# Patient Record
Sex: FEMALE | Race: WHITE | Employment: FULL TIME | URBAN - METROPOLITAN AREA
[De-identification: names, ages, dates, MRNs, and addresses within clinical notes are randomized per-mention and may not be internally consistent; named-entity substitution may affect disease eponyms.]

---

## 2022-11-10 ENCOUNTER — HOSPITAL ENCOUNTER (EMERGENCY)
Dept: GENERAL RADIOLOGY | Age: 57
Discharge: HOME | DRG: 190 | End: 2022-11-13

## 2022-11-10 LAB
ALBUMIN SERPL-MCNC: 3.7 G/DL (ref 3.5–5)
ALBUMIN/GLOB SERPL: 1 {RATIO} (ref 0.4–1.6)
ALP SERPL-CCNC: 67 U/L (ref 50–136)
ALT SERPL-CCNC: 38 U/L (ref 12–65)
ANION GAP SERPL CALC-SCNC: 4 MMOL/L (ref 2–11)
AST SERPL-CCNC: 19 U/L (ref 15–37)
B PERT DNA SPEC QL NAA+PROBE: NOT DETECTED
BASOPHILS # BLD: 0.1 K/UL (ref 0–0.2)
BASOPHILS NFR BLD: 1 % (ref 0–2)
BILIRUB SERPL-MCNC: 0.4 MG/DL (ref 0.2–1.1)
BORDETELLA PARAPERTUSSIS BY PCR: NOT DETECTED
BUN SERPL-MCNC: 14 MG/DL (ref 6–23)
C PNEUM DNA SPEC QL NAA+PROBE: NOT DETECTED
CALCIUM SERPL-MCNC: 9.3 MG/DL (ref 8.3–10.4)
CHLORIDE SERPL-SCNC: 104 MMOL/L (ref 101–110)
CO2 SERPL-SCNC: 27 MMOL/L (ref 21–32)
CREAT SERPL-MCNC: 0.8 MG/DL (ref 0.6–1)
DIFFERENTIAL METHOD BLD: ABNORMAL
EOSINOPHIL # BLD: 0.1 K/UL (ref 0–0.8)
EOSINOPHIL NFR BLD: 1 % (ref 0.5–7.8)
ERYTHROCYTE [DISTWIDTH] IN BLOOD BY AUTOMATED COUNT: 13.4 % (ref 11.9–14.6)
FLUAV SUBTYP SPEC NAA+PROBE: NOT DETECTED
FLUBV RNA SPEC QL NAA+PROBE: NOT DETECTED
GLOBULIN SER CALC-MCNC: 3.8 G/DL (ref 2.8–4.5)
GLUCOSE SERPL-MCNC: 155 MG/DL (ref 65–100)
HADV DNA SPEC QL NAA+PROBE: NOT DETECTED
HCOV 229E RNA SPEC QL NAA+PROBE: NOT DETECTED
HCOV HKU1 RNA SPEC QL NAA+PROBE: NOT DETECTED
HCOV NL63 RNA SPEC QL NAA+PROBE: NOT DETECTED
HCOV OC43 RNA SPEC QL NAA+PROBE: NOT DETECTED
HCT VFR BLD AUTO: 44.1 % (ref 35.8–46.3)
HGB BLD-MCNC: 14.1 G/DL (ref 11.7–15.4)
HMPV RNA SPEC QL NAA+PROBE: NOT DETECTED
HPIV1 RNA SPEC QL NAA+PROBE: NOT DETECTED
HPIV2 RNA SPEC QL NAA+PROBE: NOT DETECTED
HPIV3 RNA SPEC QL NAA+PROBE: NOT DETECTED
HPIV4 RNA SPEC QL NAA+PROBE: NOT DETECTED
IMM GRANULOCYTES # BLD AUTO: 0 K/UL (ref 0–0.5)
IMM GRANULOCYTES NFR BLD AUTO: 0 % (ref 0–5)
LYMPHOCYTES # BLD: 2 K/UL (ref 0.5–4.6)
LYMPHOCYTES NFR BLD: 16 % (ref 13–44)
M PNEUMO DNA SPEC QL NAA+PROBE: NOT DETECTED
MAGNESIUM SERPL-MCNC: 2.2 MG/DL (ref 1.8–2.4)
MCH RBC QN AUTO: 29.3 PG (ref 26.1–32.9)
MCHC RBC AUTO-ENTMCNC: 32 G/DL (ref 31.4–35)
MCV RBC AUTO: 91.5 FL (ref 82–102)
MONOCYTES # BLD: 0.7 K/UL (ref 0.1–1.3)
MONOCYTES NFR BLD: 6 % (ref 4–12)
NEUTS SEG # BLD: 9.1 K/UL (ref 1.7–8.2)
NEUTS SEG NFR BLD: 76 % (ref 43–78)
NRBC # BLD: 0 K/UL (ref 0–0.2)
PLATELET # BLD AUTO: 220 K/UL (ref 150–450)
PMV BLD AUTO: 10.8 FL (ref 9.4–12.3)
POTASSIUM SERPL-SCNC: 3.8 MMOL/L (ref 3.5–5.1)
PROT SERPL-MCNC: 7.5 G/DL (ref 6.3–8.2)
RBC # BLD AUTO: 4.82 M/UL (ref 4.05–5.2)
RSV RNA SPEC QL NAA+PROBE: NOT DETECTED
RV+EV RNA SPEC QL NAA+PROBE: NOT DETECTED
SARS-COV-2 RNA RESP QL NAA+PROBE: NOT DETECTED
SODIUM SERPL-SCNC: 135 MMOL/L (ref 133–143)
WBC # BLD AUTO: 12 K/UL (ref 4.3–11.1)

## 2022-11-10 PROCEDURE — 93005 ELECTROCARDIOGRAM TRACING: CPT | Performed by: EMERGENCY MEDICINE

## 2022-11-10 PROCEDURE — 96374 THER/PROPH/DIAG INJ IV PUSH: CPT

## 2022-11-10 PROCEDURE — 83735 ASSAY OF MAGNESIUM: CPT

## 2022-11-10 PROCEDURE — 0202U NFCT DS 22 TRGT SARS-COV-2: CPT

## 2022-11-10 PROCEDURE — 96376 TX/PRO/DX INJ SAME DRUG ADON: CPT

## 2022-11-10 PROCEDURE — 85025 COMPLETE CBC W/AUTO DIFF WBC: CPT

## 2022-11-10 PROCEDURE — 80053 COMPREHEN METABOLIC PANEL: CPT

## 2022-11-10 PROCEDURE — 71045 X-RAY EXAM CHEST 1 VIEW: CPT

## 2022-11-10 PROCEDURE — 99285 EMERGENCY DEPT VISIT HI MDM: CPT

## 2022-11-10 PROCEDURE — 96375 TX/PRO/DX INJ NEW DRUG ADDON: CPT

## 2022-11-10 RX ORDER — SODIUM CHLORIDE 0.9 % (FLUSH) 0.9 %
10 SYRINGE (ML) INJECTION EVERY 8 HOURS
Status: DISCONTINUED | OUTPATIENT
Start: 2022-11-10 | End: 2022-11-13 | Stop reason: HOSPADM

## 2022-11-10 RX ORDER — SODIUM CHLORIDE 0.9 % (FLUSH) 0.9 %
10 SYRINGE (ML) INJECTION AS NEEDED
Status: DISCONTINUED | OUTPATIENT
Start: 2022-11-10 | End: 2022-11-13 | Stop reason: HOSPADM

## 2022-11-10 ASSESSMENT — PAIN - FUNCTIONAL ASSESSMENT: PAIN_FUNCTIONAL_ASSESSMENT: NONE - DENIES PAIN

## 2022-11-11 ENCOUNTER — APPOINTMENT (OUTPATIENT)
Dept: CT IMAGING | Age: 57
DRG: 190 | End: 2022-11-11

## 2022-11-11 ENCOUNTER — HOSPITAL ENCOUNTER (INPATIENT)
Age: 57
LOS: 2 days | Discharge: HOME | DRG: 190 | End: 2022-11-13
Attending: EMERGENCY MEDICINE | Admitting: INTERNAL MEDICINE

## 2022-11-11 DIAGNOSIS — J18.9 ATYPICAL PNEUMONIA: ICD-10-CM

## 2022-11-11 DIAGNOSIS — J44.1 COPD EXACERBATION (HCC): Primary | ICD-10-CM

## 2022-11-11 DIAGNOSIS — R00.0 TACHYCARDIA: ICD-10-CM

## 2022-11-11 DIAGNOSIS — R09.02 HYPOXIA: ICD-10-CM

## 2022-11-11 PROBLEM — F17.200 SMOKER: Status: ACTIVE | Noted: 2022-11-11

## 2022-11-11 LAB
D DIMER PPP FEU-MCNC: 0.76 UG/ML(FEU)
EKG ATRIAL RATE: 122 BPM
EKG DIAGNOSIS: NORMAL
EKG P AXIS: 67 DEGREES
EKG P-R INTERVAL: 152 MS
EKG Q-T INTERVAL: 296 MS
EKG QRS DURATION: 80 MS
EKG QTC CALCULATION (BAZETT): 421 MS
EKG R AXIS: 70 DEGREES
EKG T AXIS: 73 DEGREES
EKG VENTRICULAR RATE: 122 BPM

## 2022-11-11 PROCEDURE — 85379 FIBRIN DEGRADATION QUANT: CPT

## 2022-11-11 PROCEDURE — 71260 CT THORAX DX C+: CPT | Performed by: PHYSICIAN ASSISTANT

## 2022-11-11 PROCEDURE — 6360000004 HC RX CONTRAST MEDICATION: Performed by: PHYSICIAN ASSISTANT

## 2022-11-11 PROCEDURE — 2700000000 HC OXYGEN THERAPY PER DAY

## 2022-11-11 PROCEDURE — 6360000002 HC RX W HCPCS: Performed by: INTERNAL MEDICINE

## 2022-11-11 PROCEDURE — 6370000000 HC RX 637 (ALT 250 FOR IP): Performed by: INTERNAL MEDICINE

## 2022-11-11 PROCEDURE — 2580000003 HC RX 258: Performed by: EMERGENCY MEDICINE

## 2022-11-11 PROCEDURE — 94760 N-INVAS EAR/PLS OXIMETRY 1: CPT

## 2022-11-11 PROCEDURE — 2580000003 HC RX 258: Performed by: PHYSICIAN ASSISTANT

## 2022-11-11 PROCEDURE — 94640 AIRWAY INHALATION TREATMENT: CPT

## 2022-11-11 PROCEDURE — 1100000000 HC RM PRIVATE

## 2022-11-11 PROCEDURE — 94761 N-INVAS EAR/PLS OXIMETRY MLT: CPT

## 2022-11-11 PROCEDURE — 6360000002 HC RX W HCPCS: Performed by: PHYSICIAN ASSISTANT

## 2022-11-11 RX ORDER — ONDANSETRON 4 MG/1
4 TABLET, ORALLY DISINTEGRATING ORAL EVERY 8 HOURS PRN
Status: DISCONTINUED | OUTPATIENT
Start: 2022-11-11 | End: 2022-11-13 | Stop reason: HOSPADM

## 2022-11-11 RX ORDER — SODIUM CHLORIDE 0.9 % (FLUSH) 0.9 %
5-40 SYRINGE (ML) INJECTION EVERY 12 HOURS SCHEDULED
Status: DISCONTINUED | OUTPATIENT
Start: 2022-11-11 | End: 2022-11-13 | Stop reason: HOSPADM

## 2022-11-11 RX ORDER — SODIUM CHLORIDE 0.9 % (FLUSH) 0.9 %
10 SYRINGE (ML) INJECTION
Status: DISCONTINUED | OUTPATIENT
Start: 2022-11-11 | End: 2022-11-13 | Stop reason: HOSPADM

## 2022-11-11 RX ORDER — IPRATROPIUM BROMIDE AND ALBUTEROL SULFATE 2.5; .5 MG/3ML; MG/3ML
1 SOLUTION RESPIRATORY (INHALATION)
Status: DISCONTINUED | OUTPATIENT
Start: 2022-11-11 | End: 2022-11-13 | Stop reason: HOSPADM

## 2022-11-11 RX ORDER — POLYETHYLENE GLYCOL 3350 17 G/17G
17 POWDER, FOR SOLUTION ORAL DAILY PRN
Status: DISCONTINUED | OUTPATIENT
Start: 2022-11-11 | End: 2022-11-13 | Stop reason: HOSPADM

## 2022-11-11 RX ORDER — PREDNISONE 20 MG/1
20 TABLET ORAL DAILY
Status: DISCONTINUED | OUTPATIENT
Start: 2022-11-11 | End: 2022-11-13 | Stop reason: HOSPADM

## 2022-11-11 RX ORDER — PHENTERMINE HYDROCHLORIDE 37.5 MG/1
TABLET ORAL
Status: ON HOLD | COMMUNITY
Start: 2022-10-12 | End: 2022-11-13 | Stop reason: HOSPADM

## 2022-11-11 RX ORDER — ACETAMINOPHEN 325 MG/1
650 TABLET ORAL EVERY 6 HOURS PRN
Status: DISCONTINUED | OUTPATIENT
Start: 2022-11-11 | End: 2022-11-13 | Stop reason: HOSPADM

## 2022-11-11 RX ORDER — SODIUM CHLORIDE 9 MG/ML
INJECTION, SOLUTION INTRAVENOUS PRN
Status: DISCONTINUED | OUTPATIENT
Start: 2022-11-11 | End: 2022-11-13 | Stop reason: HOSPADM

## 2022-11-11 RX ORDER — 0.9 % SODIUM CHLORIDE 0.9 %
100 INTRAVENOUS SOLUTION INTRAVENOUS
Status: DISCONTINUED | OUTPATIENT
Start: 2022-11-11 | End: 2022-11-13 | Stop reason: HOSPADM

## 2022-11-11 RX ORDER — GUAIFENESIN 600 MG/1
600 TABLET, EXTENDED RELEASE ORAL 2 TIMES DAILY
Status: DISCONTINUED | OUTPATIENT
Start: 2022-11-11 | End: 2022-11-13 | Stop reason: HOSPADM

## 2022-11-11 RX ORDER — ENOXAPARIN SODIUM 100 MG/ML
40 INJECTION SUBCUTANEOUS DAILY
Status: DISCONTINUED | OUTPATIENT
Start: 2022-11-11 | End: 2022-11-13 | Stop reason: HOSPADM

## 2022-11-11 RX ORDER — OMEGA-3 FATTY ACIDS/FISH OIL 300-1000MG
CAPSULE ORAL
Status: ON HOLD | COMMUNITY
End: 2022-11-13 | Stop reason: HOSPADM

## 2022-11-11 RX ORDER — SODIUM CHLORIDE 0.9 % (FLUSH) 0.9 %
5-40 SYRINGE (ML) INJECTION PRN
Status: DISCONTINUED | OUTPATIENT
Start: 2022-11-11 | End: 2022-11-13 | Stop reason: HOSPADM

## 2022-11-11 RX ORDER — ONDANSETRON 2 MG/ML
4 INJECTION INTRAMUSCULAR; INTRAVENOUS EVERY 6 HOURS PRN
Status: DISCONTINUED | OUTPATIENT
Start: 2022-11-11 | End: 2022-11-13 | Stop reason: HOSPADM

## 2022-11-11 RX ORDER — ALBUTEROL SULFATE 90 UG/1
2 AEROSOL, METERED RESPIRATORY (INHALATION) EVERY 4 HOURS PRN
Status: ON HOLD | COMMUNITY
Start: 2016-07-07 | End: 2022-11-13 | Stop reason: SDUPTHER

## 2022-11-11 RX ORDER — ACETAMINOPHEN 650 MG/1
650 SUPPOSITORY RECTAL EVERY 6 HOURS PRN
Status: DISCONTINUED | OUTPATIENT
Start: 2022-11-11 | End: 2022-11-13 | Stop reason: HOSPADM

## 2022-11-11 RX ADMIN — IPRATROPIUM BROMIDE AND ALBUTEROL SULFATE 1 AMPULE: .5; 3 SOLUTION RESPIRATORY (INHALATION) at 20:53

## 2022-11-11 RX ADMIN — IOPAMIDOL 100 ML: 755 INJECTION, SOLUTION INTRAVENOUS at 05:10

## 2022-11-11 RX ADMIN — SODIUM CHLORIDE, PRESERVATIVE FREE 10 ML: 5 INJECTION INTRAVENOUS at 21:32

## 2022-11-11 RX ADMIN — IPRATROPIUM BROMIDE AND ALBUTEROL SULFATE 1 AMPULE: .5; 3 SOLUTION RESPIRATORY (INHALATION) at 11:16

## 2022-11-11 RX ADMIN — GUAIFENESIN 600 MG: 600 TABLET, EXTENDED RELEASE ORAL at 21:32

## 2022-11-11 RX ADMIN — PREDNISONE 20 MG: 20 TABLET ORAL at 11:54

## 2022-11-11 RX ADMIN — SODIUM CHLORIDE, PRESERVATIVE FREE 10 ML: 5 INJECTION INTRAVENOUS at 06:11

## 2022-11-11 RX ADMIN — CEFTRIAXONE 1000 MG: 1 INJECTION, POWDER, FOR SOLUTION INTRAMUSCULAR; INTRAVENOUS at 07:59

## 2022-11-11 RX ADMIN — IPRATROPIUM BROMIDE AND ALBUTEROL SULFATE 1 AMPULE: .5; 3 SOLUTION RESPIRATORY (INHALATION) at 15:28

## 2022-11-11 RX ADMIN — ENOXAPARIN SODIUM 40 MG: 100 INJECTION SUBCUTANEOUS at 11:58

## 2022-11-11 RX ADMIN — GUAIFENESIN 600 MG: 600 TABLET, EXTENDED RELEASE ORAL at 11:54

## 2022-11-11 RX ADMIN — SODIUM CHLORIDE, PRESERVATIVE FREE 10 ML: 5 INJECTION INTRAVENOUS at 03:58

## 2022-11-11 SDOH — ECONOMIC STABILITY: TRANSPORTATION INSECURITY
IN THE PAST 12 MONTHS, HAS THE LACK OF TRANSPORTATION KEPT YOU FROM MEDICAL APPOINTMENTS OR FROM GETTING MEDICATIONS?: NO

## 2022-11-11 SDOH — SOCIAL STABILITY: SOCIAL INSECURITY
WITHIN THE LAST YEAR, HAVE TO BEEN RAPED OR FORCED TO HAVE ANY KIND OF SEXUAL ACTIVITY BY YOUR PARTNER OR EX-PARTNER?: NO

## 2022-11-11 SDOH — SOCIAL STABILITY: SOCIAL NETWORK: ARE YOU MARRIED, WIDOWED, DIVORCED, SEPARATED, NEVER MARRIED, OR LIVING WITH A PARTNER?: WIDOWED

## 2022-11-11 SDOH — SOCIAL STABILITY: SOCIAL INSECURITY: WITHIN THE LAST YEAR, HAVE YOU BEEN HUMILIATED OR EMOTIONALLY ABUSED IN OTHER WAYS BY YOUR PARTNER OR EX-PARTNER?: NO

## 2022-11-11 SDOH — SOCIAL STABILITY: SOCIAL NETWORK
IN A TYPICAL WEEK, HOW MANY TIMES DO YOU TALK ON THE PHONE WITH FAMILY, FRIENDS, OR NEIGHBORS?: MORE THAN THREE TIMES A WEEK

## 2022-11-11 SDOH — ECONOMIC STABILITY: INCOME INSECURITY: HOW HARD IS IT FOR YOU TO PAY FOR THE VERY BASICS LIKE FOOD, HOUSING, MEDICAL CARE, AND HEATING?: NOT HARD AT ALL

## 2022-11-11 SDOH — SOCIAL STABILITY: SOCIAL INSECURITY: WITHIN THE LAST YEAR, HAVE YOU BEEN AFRAID OF YOUR PARTNER OR EX-PARTNER?: NO

## 2022-11-11 SDOH — ECONOMIC STABILITY: FOOD INSECURITY: WITHIN THE PAST 12 MONTHS, THE FOOD YOU BOUGHT JUST DIDN'T LAST AND YOU DIDN'T HAVE MONEY TO GET MORE.: NEVER TRUE

## 2022-11-11 SDOH — HEALTH STABILITY: MENTAL HEALTH: HOW OFTEN DO YOU HAVE A DRINK CONTAINING ALCOHOL?: 2-4 TIMES A MONTH

## 2022-11-11 SDOH — HEALTH STABILITY: MENTAL HEALTH
STRESS IS WHEN SOMEONE FEELS TENSE, NERVOUS, ANXIOUS, OR CAN'T SLEEP AT NIGHT BECAUSE THEIR MIND IS TROUBLED. HOW STRESSED ARE YOU?: NOT AT ALL

## 2022-11-11 SDOH — SOCIAL STABILITY: SOCIAL NETWORK: HOW OFTEN DO YOU ATTEND CHURCH OR RELIGIOUS SERVICES?: MORE THAN 4 TIMES PER YEAR

## 2022-11-11 SDOH — ECONOMIC STABILITY: HOUSING INSECURITY
IN THE LAST 12 MONTHS, WAS THERE A TIME WHEN YOU DID NOT HAVE A STEADY PLACE TO SLEEP OR SLEPT IN A SHELTER (INCLUDING NOW)?: NO

## 2022-11-11 SDOH — ECONOMIC STABILITY: TRANSPORTATION INSECURITY
IN THE PAST 12 MONTHS, HAS LACK OF TRANSPORTATION KEPT YOU FROM MEETINGS, WORK, OR FROM GETTING THINGS NEEDED FOR DAILY LIVING?: NO

## 2022-11-11 SDOH — ECONOMIC STABILITY: FOOD INSECURITY: WITHIN THE PAST 12 MONTHS, YOU WORRIED THAT YOUR FOOD WOULD RUN OUT BEFORE YOU GOT MONEY TO BUY MORE.: NEVER TRUE

## 2022-11-11 SDOH — SOCIAL STABILITY: SOCIAL NETWORK: HOW OFTEN DO YOU GET TOGETHER WITH FRIENDS OR RELATIVES?: MORE THAN THREE TIMES A WEEK

## 2022-11-11 SDOH — SOCIAL STABILITY: SOCIAL INSECURITY
WITHIN THE LAST YEAR, HAVE YOU BEEN KICKED, HIT, SLAPPED, OR OTHERWISE PHYSICALLY HURT BY YOUR PARTNER OR EX-PARTNER?: NO

## 2022-11-11 SDOH — ECONOMIC STABILITY: INCOME INSECURITY: IN THE LAST 12 MONTHS, WAS THERE A TIME WHEN YOU WERE NOT ABLE TO PAY THE MORTGAGE OR RENT ON TIME?: NO

## 2022-11-11 ASSESSMENT — ENCOUNTER SYMPTOMS
VOMITING: 0
COUGH: 1
SORE THROAT: 0
RHINORRHEA: 0
SHORTNESS OF BREATH: 1
NAUSEA: 0
ABDOMINAL PAIN: 0

## 2022-11-11 ASSESSMENT — LIFESTYLE VARIABLES
HOW MANY STANDARD DRINKS CONTAINING ALCOHOL DO YOU HAVE ON A TYPICAL DAY: 1 OR 2
HOW OFTEN DO YOU HAVE A DRINK CONTAINING ALCOHOL: MONTHLY OR LESS

## 2022-11-11 NOTE — ED NOTES
TRANSFER - OUT REPORT:    Verbal report given to Honey Martinez on Kerline Perez  being transferred to [facility] for routine progression of patient care       Report consisted of patient's Situation, Background, Assessment and   Recommendations(SBAR). Information from the following report(s) ED Encounter Summary was reviewed with the receiving nurse. [place] Assessment: Presents to emergency department  because of falls (Syncope, seizure, or loss of consciousness): No, Age > 79: No, Altered Mental Status, Intoxication with alcohol or substance confusion (Disorientation, impaired judgment, poor safety awaremess, or inability to follow instructions): No, Impaired Mobility: Ambulates or transfers with assistive devices or assistance; Unable to ambulate or transer.: No, Nursing Judgement: No  Lines:   Peripheral IV 11/10/22 Right Antecubital (Active)        Opportunity for questions and clarification was provided.       Patient transported with:  Yojana Martínez RN  11/11/22 0204

## 2022-11-11 NOTE — H&P
Hospitalist History and Physical   Admit Date:  2022  2:42 AM   Name:  Allegra Borrero   Age:  61 y.o. Sex:  female  :  1965   MRN:  [id number]   Room:  ER31/31    Presenting Complaint: Shortness of Breath     Reason(s) for Admission: COPD exacerbation ([facility] [J44.1]     History of Present Illness:   Allegra Borrero is a 61 y.o. female with medical history of   COPD, not on oxygen at home. Smoking for the past 20-30 years. Still smoking. BMI of 34    She does not take home medications. She denies hypertension, hyperlipidemia. who presented with shortness of breath for the past few days. Cough, no fever, shortness of breath. Patient was found to have acute respiratory failure with oxygen saturation of 87% on room air in ER. She was given 2 L/min of oxygen via cannula and her oxygen saturation was up to 93%. Patient continues to feel shortness of breath and coughing. CXR does not show acute findings. Hospitalist service is requested to admit the patient. Review of Systems:  10 systems reviewed and negative except as noted in HPI. Assessment & Plan:     Principal Problem:    COPD exacerbation ([facility]  Plan:   Admit to medical floor. IV access   Advise patient to drink good amount of fluid. Nebulizer. Will give Prednisone 20 mg po q day. Mucinex to help coughing. Symptomatic treatments   Monitor      Active Problems:    Smoker  Plan:   I advised patient to quit. May need Nicotine patch. Monitor     BMI of 34   Patient will be counseled for weight loss and maintenance of healthy weight when recovering from this acute illness. Patient requires hospital stay as an in-patient and anticipated stay is more than 2 midnights due to the serious nature of the illness. Patient has no pain now. Will monitor. Further treatments will depend on initial responses and findings. I have discussed the plan of care with patient.         Anticipated discharge needs:     Home in 1-2 days. Diet: ADULT DIET; Regular  VTE ppx: Enoxaparin SC   Code status: Full Code    Hospital Problems:  Principal Problem:    COPD exacerbation ([address]  Active Problems:    Smoker  Resolved Problems:    * No resolved hospital problems. *       Past History:     COPD   Smoker    No past surgical history     Social History     Tobacco Use    Smoking status: Not on file    Smokeless tobacco: Not on file   Substance Use Topics    Alcohol use: Not on file      Social History     Substance and Sexual Activity   Drug Use Not on file     Family history   No hereditary medical history in family members. Immunization History   Administered Date(s) Administered    PPD Test 11/20/2015     Allergies   Allergen Reactions    Sulfa Antibiotics Hives     Prior to Admit Medications:  No current outpatient medications      Objective:   Patient Vitals for the past 24 hrs:   Temp Pulse Resp BP SpO2   11/11/22 0825 -- 98 19 112/74 92 %   11/11/22 0815 -- (!) 102 17 -- 93 %   11/11/22 0755 -- 97 21 123/76 93 %   11/11/22 0725 -- 98 23 121/80 93 %   11/11/22 0655 -- -- -- 112/69 --   11/11/22 0645 -- 94 22 -- 96 %   11/11/22 0615 -- 95 20 -- 95 %   11/11/22 0609 -- 98 22 -- 93 %   11/11/22 0602 -- 99 21 -- (!) 87 %   11/11/22 0500 -- 99 22 115/69 91 %   11/11/22 0445 -- 97 21 121/78 93 %   11/11/22 0430 -- 98 19 117/73 93 %   11/11/22 0415 -- 95 24 117/74 94 %   11/11/22 0400 -- 97 22 127/74 94 %   11/11/22 0353 -- (!) 102 19 -- 94 %   11/11/22 0350 -- (!) 103 22 123/75 (!) 89 %   11/10/22 2042 98.8 °F (37.1 °C) (!) 121 22 (!) 141/82 96 %       Oxygen Therapy  SpO2: 92 %  O2 Device: Nasal cannula  O2 Flow Rate (L/min): 2 L/min    Estimated body mass index is 33.84 kg/m² as calculated from the following:    Height as of this encounter: 5' 2\" (1.575 m). Weight as of this encounter: 185 lb (83.9 kg).   No intake or output data in the 24 hours ending 11/11/22 0841      Physical Exam:    Blood pressure 112/74, pulse 98, temperature 98.8 °F (37.1 °C), temperature source Oral, resp. rate 19, height 5' 2\" (1.575 m), weight 185 lb (83.9 kg), SpO2 92 %. General:    Well nourished. BMI of 34  Patient is lying in bed with head up. She is on oxygen cannula at 2 L/min. Patient has some coughing. Head:  Normocephalic, atraumatic  Eyes:  Sclerae appear normal.  Pupils equally round. ENT:  Nares appear normal, no drainage. Moist oral mucosa  Neck:  No restricted ROM. Trachea midline   CV:   RRR. No m/r/g. No jugular venous distension. Lungs:   Decreased breath sound bilaterally. Occasional wheezing, no rhonchi, no rales. Symmetric expansion. Abdomen: Bowel sounds present. Soft, nontender, nondistended. Extremities: No cyanosis or clubbing. No edema  Skin:     No rashes and normal coloration. Warm and dry. Neuro:  CN II-XII grossly intact. Sensation intact. A&Ox3  Psych:  Normal mood and affect.       I have personally reviewed labs and tests showing:  Recent Labs:  Recent Results (from the past 24 hour(s))   EKG 12 Lead    Collection Time: 11/10/22  8:54 PM   Result Value Ref Range    Ventricular Rate 122 BPM    Atrial Rate 122 BPM    P-R Interval 152 ms    QRS Duration 80 ms    Q-T Interval 296 ms    QTc Calculation (Bazett) 421 ms    P Axis 67 degrees    R Axis 70 degrees    T Axis 73 degrees    Diagnosis Sinus tachycardia    CBC with Auto Differential    Collection Time: 11/10/22  8:55 PM   Result Value Ref Range    WBC 12.0 (H) 4.3 - 11.1 K/uL    RBC 4.82 4.05 - 5.2 M/uL    Hemoglobin 14.1 11.7 - 15.4 g/dL    Hematocrit 44.1 35.8 - 46.3 %    MCV 91.5 82 - 102 FL    MCH 29.3 26.1 - 32.9 PG    MCHC 32.0 31.4 - 35.0 g/dL    RDW 13.4 11.9 - 14.6 %    Platelets 972 838 - 864 K/uL    MPV 10.8 9.4 - 12.3 FL    nRBC 0.00 0.0 - 0.2 K/uL    Differential Type AUTOMATED      Seg Neutrophils 76 43 - 78 %    Lymphocytes 16 13 - 44 %    Monocytes 6 4.0 - 12.0 %    Eosinophils % 1 0.5 - 7.8 %    Basophils 1 0.0 - 2.0 % Immature Granulocytes 0 0.0 - 5.0 %    Segs Absolute 9.1 (H) 1.7 - 8.2 K/UL    Absolute Lymph # 2.0 0.5 - 4.6 K/UL    Absolute Mono # 0.7 0.1 - 1.3 K/UL    Absolute Eos # 0.1 0.0 - 0.8 K/UL    Basophils Absolute 0.1 0.0 - 0.2 K/UL    Absolute Immature Granulocyte 0.0 0.0 - 0.5 K/UL   Comprehensive Metabolic Panel    Collection Time: 11/10/22  8:55 PM   Result Value Ref Range    Sodium 135 133 - 143 mmol/L    Potassium 3.8 3.5 - 5.1 mmol/L    Chloride 104 101 - 110 mmol/L    CO2 27 21 - 32 mmol/L    Anion Gap 4 2 - 11 mmol/L    Glucose 155 (H) 65 - 100 mg/dL    BUN 14 6 - 23 MG/DL    Creatinine 0.80 0.6 - 1.0 MG/DL    Est, Glom Filt Rate >60 >60 ml/min/1.73m2    Calcium 9.3 8.3 - 10.4 MG/DL    Total Bilirubin 0.4 0.2 - 1.1 MG/DL    ALT 38 12 - 65 U/L    AST 19 15 - 37 U/L    Alk Phosphatase 67 50 - 136 U/L    Total Protein 7.5 6.3 - 8.2 g/dL    Albumin 3.7 3.5 - 5.0 g/dL    Globulin 3.8 2.8 - 4.5 g/dL    Albumin/Globulin Ratio 1.0 0.4 - 1.6     Magnesium    Collection Time: 11/10/22  8:55 PM   Result Value Ref Range    Magnesium 2.2 1.8 - 2.4 mg/dL   Respiratory Panel, Molecular, with COVID-19 (Restricted: peds pts or suitable admitted adults)    Collection Time: 11/10/22  8:55 PM    Specimen: Nasopharyngeal   Result Value Ref Range    Adenovirus by PCR NOT DETECTED NOTDET      Coronavirus 229E by PCR NOT DETECTED NOTDET      Coronavirus HKU1 by PCR NOT DETECTED NOTDET      Coronavirus NL63 by PCR NOT DETECTED NOTDET      Coronavirus OC43 by PCR NOT DETECTED NOTDET      SARS-CoV-2, PCR NOT DETECTED NOTDET      Human Metapneumovirus by PCR NOT DETECTED NOTDET      Rhinovirus Enterovirus PCR NOT DETECTED NOTDET      Influenza A by PCR NOT DETECTED NOTDET      Influenza B PCR NOT DETECTED NOTDET      Parainfluenza 1 PCR NOT DETECTED NOTDET      Parainfluenza 2 PCR NOT DETECTED NOTDET      Parainfluenza 3 PCR NOT DETECTED NOTDET      Parainfluenza 4 PCR NOT DETECTED NOTDET      Respiratory Syncytial Virus by PCR NOT DETECTED NOTDET      Bordetella parapertussis by PCR NOT DETECTED NOTDET      Bordetella pertussis by PCR NOT DETECTED NOTDET      Chlamydophila Pneumonia PCR NOT DETECTED NOTDET      Mycoplasma pneumo by PCR NOT DETECTED NOTDET     D-Dimer, Quantitative    Collection Time: 11/11/22  3:56 AM   Result Value Ref Range    D-Dimer, Quant 0.76 (H) <0.56 ug/ml(FEU)       I have personally reviewed imaging studies showing:  XR CHEST PORTABLE    Result Date: 11/10/2022  Chest X-ray INDICATION: Shortness of breath AP/PA view of the chest was obtained. FINDINGS: Large bulla is again noted in the left lung base. There is no definite acute infiltrate. The heart size is normal.  The bony thorax is intact. No acute findings in the chest     CT CHEST PULMONARY EMBOLISM W CONTRAST    Result Date: 11/11/2022  Clinical history: Shortness of breath. Hypoxia. TECHNIQUE: Axial, coronal and sagittal CT of the chest with 100 cc of IV contrast.  Radiation dose reduction techniques were used for this study:  Our CT scanners use one or all of the following: Automated exposure control, adjustment of the mA and/or kVp according to patient's size, iterative reconstruction. Comparison: Chest x-ray yesterday and CT November 2015 FINDINGS: There is no pulmonary embolism. The heart is not enlarged. There is no pericardial effusion. The thoracic aorta is normal in course and caliber. There is no lymphadenopathy. There is no endobronchial lesion. There is small cluster of centrilobular nodularities in the right middle lobe. Small area of tiny centrilobular nodularities in the right upper lobe is similar to 2015 study. There is small consolidation in the left lung base. This area measures up to 5 cm similar to prior exam of 2015. Bullous changes are noted in the left lower lobe with largest bulla measuring up to 10 cm. These are similar to 2015 study. There is no pneumothorax, pulmonary edema or pleural effusion.   Mild dependent subsegmental atelectasis. Included upper abdomen is unremarkable. Surrounding bones are intact. 1.  Negative for pulmonary embolism. 2.  Small cluster of centrilobular nodularities in the right middle lobe, nonspecific but may be infectious or inflammatory. 3.  Extensive bullous changes in the left lower lobe. Small consolidation in the left lung base and small cluster of centrilobular nodularities in the right upper lobe. These findings are similar dating back to November 20, 2015 CT. 4.  No new pulmonary consolidation. No pneumothorax or pulmonary edema. Echocardiogram:  No results found for this or any previous visit.         Orders Placed This Encounter   Medications    sodium chloride flush 0.9 % injection 10 mL    sodium chloride flush 0.9 % injection 10 mL    0.9 % sodium chloride bolus    sodium chloride flush 0.9 % injection 10 mL    iopamidol (ISOVUE-370) 76 % injection 100 mL    cefTRIAXone (ROCEPHIN) 1,000 mg in sodium chloride 0.9 % 50 mL IVPB mini-bag     Order Specific Question:   Antimicrobial Indications     Answer:   Pneumonia (CAP)    sodium chloride flush 0.9 % injection 5-40 mL    sodium chloride flush 0.9 % injection 5-40 mL    0.9 % sodium chloride infusion    enoxaparin (LOVENOX) injection 40 mg     Order Specific Question:   Indication of Use     Answer:   Prophylaxis-DVT/PE    OR Linked Order Group     ondansetron (ZOFRAN-ODT) disintegrating tablet 4 mg     ondansetron (ZOFRAN) injection 4 mg    polyethylene glycol (GLYCOLAX) packet 17 g    OR Linked Order Group     acetaminophen (TYLENOL) tablet 650 mg     acetaminophen (TYLENOL) suppository 650 mg    ipratropium-albuterol (DUONEB) nebulizer solution 1 ampule     Order Specific Question:   Initiate RT Bronchodilator Protocol     Answer:   Yes - Inpatient Protocol    guaiFENesin (MUCINEX) extended release tablet 600 mg         Signed:  Shelly Soliman MD    Part of this note may have been written by using a voice dictation software. The note has been proof read but may still contain some grammatical/other typographical errors.

## 2022-11-11 NOTE — ED TRIAGE NOTES
Pt arrives ambulatory to ed with c/o shob for two days. Pt did breathing tx before leaving home and stated it helped a little. Pt does not wear o2 at home. Pt hx of copd and pleurisy as a child. Pt not on blood thinners, deniies chest pain and denies dizziness.

## 2022-11-11 NOTE — ED NOTES
Pt taken off oxygen for room air trial per provider. Oxygen fell to 87% on room air while at rest. Pt placed on 2L oxygen via NC, oxygen saturation rylie to 93%. Provider aware.       Cali Higuera RN  11/11/22 [id number]

## 2022-11-11 NOTE — ED PROVIDER NOTES
Emergency Department Provider Note                   PCP:                Kim Ibrahim MD               Age: 61 y.o. Sex: female       ICD-10-CM    1. Atypical pneumonia  J18.9       2. Tachycardia  R00.0       3. Hypoxia  R09.02           DISPOSITION Decision To Admit 11/11/2022 06:19:07 AM       MDM  Number of Diagnoses or Management Options  Atypical pneumonia  Hypoxia  Tachycardia  Diagnosis management comments: Patient is a 58-year-old female with history of COPD who presents to facility today with worsening shortness of breath and inspiratory pain over the past 72 hours. Patient tachycardic in the 120s on arrival. EKG shows sinus tach. Lung sounds diminished without any obvious abnormal sounds detected. Patient is hypoxic on room air bouncing between 86 to 88% and is not on any oxygen at home. On 2 L of O2 she is staying at 92 to 93%. Labs show a WBC of 12. Elevated D-dimer of 0.76. Chest x-ray clear. Given the elevated dimer and suspicion for possible PE a CT chest was obtained. There are noted centrilobular nodularities in the right middle lobe which likely represents an atypical pneumonia in light of the elevated white blood cell count and the worsening shortness of breath and hypoxia over the past few days. Admit for atypical pneumonia and new onset hypoxia. IV Rocephin ordered at this time. Patient in stable condition.        Amount and/or Complexity of Data Reviewed  Clinical lab tests: ordered and reviewed  Tests in the radiology section of CPT®: reviewed and ordered  Tests in the medicine section of CPT®: ordered and reviewed  Review and summarize past medical records: yes  Discuss the patient with other providers: yes (Dr. Shanna Hill, on duty hospitalist)  Independent visualization of images, tracings, or specimens: yes    Risk of Complications, Morbidity, and/or Mortality  Presenting problems: moderate  Diagnostic procedures: moderate  Management options: moderate  General comments: CT CHEST PULMONARY EMBOLISM W CONTRAST   Final Result        1. Negative for pulmonary embolism. 2.  Small cluster of centrilobular nodularities in the right middle lobe,    nonspecific but may be infectious or inflammatory. 3.  Extensive bullous changes in the left lower lobe. Small consolidation in    the left lung base and small cluster of centrilobular nodularities in the right    upper lobe. These findings are similar dating back to November 20, 2015 CT. 4.  No new pulmonary consolidation. No pneumothorax or pulmonary edema. XR CHEST PORTABLE   Final Result    No acute findings in the chest         The patient was observed in the ED.     Results Reviewed:      Recent Results (from the past 24 hour(s))  -EKG 12 Lead:   Collection Time: 11/10/22  8:54 PM       Result                      Value             Ref Range           Ventricular Rate            122               BPM                 Atrial Rate                 122               BPM                 P-R Interval                152               ms                  QRS Duration                80                ms                  Q-T Interval                296               ms                  QTc Calculation (Bazet*     421               ms                  P Axis                      67                degrees             R Axis                      70                degrees             T Axis                      73                degrees             Diagnosis                                                     Sinus tachycardia  -CBC with Auto Differential:   Collection Time: 11/10/22  8:55 PM       Result                      Value             Ref Range           WBC                         12.0 (H)          4.3 - 11.1 K*       RBC                         4.82              4.05 - 5.2 M*       Hemoglobin                  14.1              11.7 - 15.4 *       Hematocrit                  44.1              35.8 - 46.3 %       MCV 91.5              82 - 102 FL         MCH                         29.3              26.1 - 32.9 *       MCHC                        32.0              31.4 - 35.0 *       RDW                         13.4              11.9 - 14.6 %       Platelets                   220               150 - 450 K/*       MPV                         10.8              9.4 - 12.3 FL       nRBC                        0.00              0.0 - 0.2 K/*       Differential Type           AUTOMATED                             Seg Neutrophils             76                43 - 78 %           Lymphocytes                 16                13 - 44 %           Monocytes                   6                 4.0 - 12.0 %        Eosinophils %               1                 0.5 - 7.8 %         Basophils                   1                 0.0 - 2.0 %         Immature Granulocytes       0                 0.0 - 5.0 %         Segs Absolute               9.1 (H)           1.7 - 8.2 K/*       Absolute Lymph #            2.0               0.5 - 4.6 K/*       Absolute Mono #             0.7               0.1 - 1.3 K/*       Absolute Eos #              0.1               0.0 - 0.8 K/*       Basophils Absolute          0.1               0.0 - 0.2 K/*       Absolute Immature Gran*     0.0               0.0 - 0.5 K/*  -Comprehensive Metabolic Panel:   Collection Time: 11/10/22  8:55 PM       Result                      Value             Ref Range           Sodium                      135               133 - 143 mm*       Potassium                   3.8               3.5 - 5.1 mm*       Chloride                    104               101 - 110 mm*       CO2                         27                21 - 32 mmol*       Anion Gap                   4                 2 - 11 mmol/L       Glucose                     155 (H)           65 - 100 mg/*       BUN                         14                6 - 23 MG/DL        Creatinine                  0.80 0.6 - 1.0 MG*       Est, Glom Filt Rate         >60               >60 ml/min/1*       Calcium                     9.3               8.3 - 10.4 M*       Total Bilirubin             0.4               0.2 - 1.1 MG*       ALT                         38                12 - 65 U/L         AST                         19                15 - 37 U/L         Alk Phosphatase             67                50 - 136 U/L        Total Protein               7.5               6.3 - 8.2 g/*       Albumin                     3.7               3.5 - 5.0 g/*       Globulin                    3.8               2.8 - 4.5 g/*       Albumin/Globulin Ratio      1.0               0.4 - 1.6      -Magnesium:   Collection Time: 11/10/22  8:55 PM       Result                      Value             Ref Range           Magnesium                   2.2               1.8 - 2.4 mg*  -Respiratory Panel, Molecular, with COVID-19 (Restricted: peds pts or suitable admitted adults):   Collection Time: 11/10/22  8:55 PM  Specimen: Nasopharyngeal       Result                      Value             Ref Range           Adenovirus by PCR           NOT DETECTED      NOTDET              Coronavirus 229E by PCR     NOT DETECTED      NOTDET              Coronavirus HKU1 by PCR     NOT DETECTED      NOTDET              Coronavirus NL63 by PCR     NOT DETECTED      NOTDET              Coronavirus OC43 by PCR     NOT DETECTED      NOTDET              SARS-CoV-2, PCR             NOT DETECTED      NOTDET              Human Metapneumovirus *     NOT DETECTED      NOTDET              Rhinovirus Enterovirus*     NOT DETECTED      NOTDET              Influenza A by PCR          NOT DETECTED      NOTDET              Influenza B PCR             NOT DETECTED      NOTDET              Parainfluenza 1 PCR         NOT DETECTED      NOTDET              Parainfluenza 2 PCR         NOT DETECTED      NOTDET              Parainfluenza 3 PCR         NOT DETECTED      NOTDET Parainfluenza 4 PCR         NOT DETECTED      NOTDET              Respiratory Syncytial *     NOT DETECTED      NOTDET              Bordetella parapertuss*     NOT DETECTED      NOTDET              Bordetella pertussis b*     NOT DETECTED      NOTDET              Chlamydophila Pneumoni*     NOT DETECTED      NOTDET              Mycoplasma pneumo by P*     NOT DETECTED      NOTDET         -D-Dimer, Quantitative:   Collection Time: 11/11/22  3:56 AM       Result                      Value             Ref Range           D-Dimer, Quant              0.76 (H)          <0.56 ug/ml(*    Patient Progress  Patient progress: stable             Orders Placed This Encounter   Procedures    Respiratory Panel, Molecular, with COVID-19 (Restricted: peds pts or suitable admitted adults)    XR CHEST PORTABLE    CT CHEST PULMONARY EMBOLISM W CONTRAST    CBC with Auto Differential    Comprehensive Metabolic Panel    Magnesium    D-Dimer, Quantitative    Cardiac Monitor - ED Only    Continuous Pulse Oximetry    EKG 12 Lead    Saline lock IV        Medications   sodium chloride flush 0.9 % injection 10 mL (10 mLs IntraVENous Given 11/11/22 0611)   sodium chloride flush 0.9 % injection 10 mL (has no administration in time range)   0.9 % sodium chloride bolus (has no administration in time range)   sodium chloride flush 0.9 % injection 10 mL (has no administration in time range)   cefTRIAXone (ROCEPHIN) 1,000 mg in sodium chloride 0.9 % 50 mL IVPB mini-bag (has no administration in time range)   iopamidol (ISOVUE-370) 76 % injection 100 mL (100 mLs IntraVENous Given 11/11/22 0510)       New Prescriptions    No medications on file        Peter Youngblood is a 61 y.o. female who presents to the Emergency Department with chief complaint of    Chief Complaint   Patient presents with    Shortness of Breath      Patient is a 58-year-old female who presents to facility today with history of COPD for worsening shortness of breath for the past 72 hours and inspiratory pain. She states that she does use albuterol at home and states that gives a very brief symptom relief before just returns back. she states it is gotten worse such that she feels shorter of breath with less exertion than previously. She reports she does not require oxygen use at home. She denies any fevers, chills, chest pain, abdominal pain, nausea, vomiting, diarrhea, constipation or other symptoms. She states she has not been around anyone ill that she knows of. She denies any history of blood clots. She states the only medication she takes on a routine basis is a weight loss medication. She denies any significant surgical history. No family history noted. The history is provided by the patient. All other systems reviewed and are negative unless otherwise stated in the history of present illness section. Review of Systems   Constitutional:  Negative for chills and fever. HENT:  Negative for congestion, rhinorrhea and sore throat. Respiratory:  Positive for cough and shortness of breath. Cardiovascular:  Negative for chest pain. Gastrointestinal:  Negative for abdominal pain, nausea and vomiting. Genitourinary:  Negative for dysuria, frequency and urgency. Musculoskeletal:  Negative for gait problem. Neurological:  Negative for dizziness, syncope and headaches. Psychiatric/Behavioral:  Negative for agitation and behavioral problems. All other systems reviewed and are negative. No past medical history on file. No past surgical history on file. No family history on file. Social History     Socioeconomic History    Marital status:         Allergies: Sulfa antibiotics    Previous Medications    No medications on file        Vitals signs and nursing note reviewed.    Patient Vitals for the past 4 hrs:   Pulse Resp BP SpO2   11/11/22 0615 95 20 -- 95 %   11/11/22 0609 98 22 -- 93 %   11/11/22 0602 99 21 -- (!) 87 %   11/11/22 0500 99 22 115/69 91 %   11/11/22 0445 97 21 121/78 93 %   11/11/22 0430 98 19 117/73 93 %   11/11/22 0415 95 24 117/74 94 %   11/11/22 0400 97 22 127/74 94 %   11/11/22 0353 (!) 102 19 -- 94 %   11/11/22 0350 (!) 103 22 123/75 (!) 89 %          Physical Exam  Vitals and nursing note reviewed. Constitutional:       General: She is not in acute distress. Appearance: Normal appearance. She is well-developed. She is not ill-appearing. HENT:      Head: Normocephalic and atraumatic. Right Ear: External ear normal.      Left Ear: External ear normal.   Eyes:      Extraocular Movements: Extraocular movements intact. Conjunctiva/sclera: Conjunctivae normal.   Cardiovascular:      Rate and Rhythm: Normal rate and regular rhythm. Pulses: Normal pulses. Heart sounds: Normal heart sounds. Pulmonary:      Effort: Pulmonary effort is normal. No accessory muscle usage or respiratory distress. Breath sounds: Decreased breath sounds present. Chest:      Chest wall: No tenderness. Abdominal:      General: Abdomen is flat. Bowel sounds are normal.      Palpations: Abdomen is soft. Musculoskeletal:         General: Normal range of motion. Cervical back: Normal range of motion. Skin:     General: Skin is warm and dry. Capillary Refill: Capillary refill takes less than 2 seconds. Neurological:      General: No focal deficit present. Mental Status: She is alert and oriented to person, place, and time.    Psychiatric:         Mood and Affect: Mood normal.         Behavior: Behavior normal.        Procedures    ED EKG Interpretation  EKG was interpreted in the absence of a cardiologist.    Rate: Tachycardia  EKG Interpretation: EKG Interpretation: sinus rhythm  ST Segments: Nonspecific ST segments - NO STEMI    Results for orders placed or performed during the hospital encounter of 11/11/22   Respiratory Panel, Molecular, with COVID-19 (Restricted: peds pts or suitable admitted adults) Specimen: Nasopharyngeal   Result Value Ref Range    Adenovirus by PCR NOT DETECTED NOTDET      Coronavirus 229E by PCR NOT DETECTED NOTDET      Coronavirus HKU1 by PCR NOT DETECTED NOTDET      Coronavirus NL63 by PCR NOT DETECTED NOTDET      Coronavirus OC43 by PCR NOT DETECTED NOTDET      SARS-CoV-2, PCR NOT DETECTED NOTDET      Human Metapneumovirus by PCR NOT DETECTED NOTDET      Rhinovirus Enterovirus PCR NOT DETECTED NOTDET      Influenza A by PCR NOT DETECTED NOTDET      Influenza B PCR NOT DETECTED NOTDET      Parainfluenza 1 PCR NOT DETECTED NOTDET      Parainfluenza 2 PCR NOT DETECTED NOTDET      Parainfluenza 3 PCR NOT DETECTED NOTDET      Parainfluenza 4 PCR NOT DETECTED NOTDET      Respiratory Syncytial Virus by PCR NOT DETECTED NOTDET      Bordetella parapertussis by PCR NOT DETECTED NOTDET      Bordetella pertussis by PCR NOT DETECTED NOTDET      Chlamydophila Pneumonia PCR NOT DETECTED NOTDET      Mycoplasma pneumo by PCR NOT DETECTED NOTDET     XR CHEST PORTABLE    Narrative    Chest X-ray    INDICATION: Shortness of breath    AP/PA view of the chest was obtained. FINDINGS: Large bulla is again noted in the left lung base. There is no  definite acute infiltrate. The heart size is normal.  The bony thorax is  intact. Impression    No acute findings in the chest     CT CHEST PULMONARY EMBOLISM W CONTRAST    Narrative    Clinical history: Shortness of breath. Hypoxia. TECHNIQUE: Axial, coronal and sagittal CT of the chest with 100 cc of IV  contrast.  Radiation dose reduction techniques were used for this study:  Our CT  scanners use one or all of the following: Automated exposure control, adjustment  of the mA and/or kVp according to patient's size, iterative reconstruction. Comparison: Chest x-ray yesterday and CT November 2015    FINDINGS:    There is no pulmonary embolism. The heart is not enlarged. There is no pericardial effusion.   The thoracic  aorta is normal in course and caliber. There is no lymphadenopathy. There is no endobronchial lesion. There is small cluster of centrilobular  nodularities in the right middle lobe. Small area of tiny centrilobular nodularities in the right upper lobe is similar  to 2015 study. There is small consolidation in the left lung base. This area measures up to 5  cm similar to prior exam of 2015. Bullous changes are noted in the left lower  lobe with largest bulla measuring up to 10 cm. These are similar to 2015 study. There is no pneumothorax, pulmonary edema or pleural effusion. Mild dependent  subsegmental atelectasis. Included upper abdomen is unremarkable. Surrounding bones are intact. Impression    1. Negative for pulmonary embolism. 2.  Small cluster of centrilobular nodularities in the right middle lobe,  nonspecific but may be infectious or inflammatory. 3.  Extensive bullous changes in the left lower lobe. Small consolidation in  the left lung base and small cluster of centrilobular nodularities in the right  upper lobe. These findings are similar dating back to November 20, 2015 CT. 4.  No new pulmonary consolidation. No pneumothorax or pulmonary edema.    CBC with Auto Differential   Result Value Ref Range    WBC 12.0 (H) 4.3 - 11.1 K/uL    RBC 4.82 4.05 - 5.2 M/uL    Hemoglobin 14.1 11.7 - 15.4 g/dL    Hematocrit 44.1 35.8 - 46.3 %    MCV 91.5 82 - 102 FL    MCH 29.3 26.1 - 32.9 PG    MCHC 32.0 31.4 - 35.0 g/dL    RDW 13.4 11.9 - 14.6 %    Platelets 742 711 - 586 K/uL    MPV 10.8 9.4 - 12.3 FL    nRBC 0.00 0.0 - 0.2 K/uL    Differential Type AUTOMATED      Seg Neutrophils 76 43 - 78 %    Lymphocytes 16 13 - 44 %    Monocytes 6 4.0 - 12.0 %    Eosinophils % 1 0.5 - 7.8 %    Basophils 1 0.0 - 2.0 %    Immature Granulocytes 0 0.0 - 5.0 %    Segs Absolute 9.1 (H) 1.7 - 8.2 K/UL    Absolute Lymph # 2.0 0.5 - 4.6 K/UL    Absolute Mono # 0.7 0.1 - 1.3 K/UL    Absolute Eos # 0.1 0.0 - 0.8 K/UL Basophils Absolute 0.1 0.0 - 0.2 K/UL    Absolute Immature Granulocyte 0.0 0.0 - 0.5 K/UL   Comprehensive Metabolic Panel   Result Value Ref Range    Sodium 135 133 - 143 mmol/L    Potassium 3.8 3.5 - 5.1 mmol/L    Chloride 104 101 - 110 mmol/L    CO2 27 21 - 32 mmol/L    Anion Gap 4 2 - 11 mmol/L    Glucose 155 (H) 65 - 100 mg/dL    BUN 14 6 - 23 MG/DL    Creatinine 0.80 0.6 - 1.0 MG/DL    Est, Glom Filt Rate >60 >60 ml/min/1.73m2    Calcium 9.3 8.3 - 10.4 MG/DL    Total Bilirubin 0.4 0.2 - 1.1 MG/DL    ALT 38 12 - 65 U/L    AST 19 15 - 37 U/L    Alk Phosphatase 67 50 - 136 U/L    Total Protein 7.5 6.3 - 8.2 g/dL    Albumin 3.7 3.5 - 5.0 g/dL    Globulin 3.8 2.8 - 4.5 g/dL    Albumin/Globulin Ratio 1.0 0.4 - 1.6     Magnesium   Result Value Ref Range    Magnesium 2.2 1.8 - 2.4 mg/dL   D-Dimer, Quantitative   Result Value Ref Range    D-Dimer, Quant 0.76 (H) <0.56 ug/ml(FEU)   EKG 12 Lead   Result Value Ref Range    Ventricular Rate 122 BPM    Atrial Rate 122 BPM    P-R Interval 152 ms    QRS Duration 80 ms    Q-T Interval 296 ms    QTc Calculation (Bazett) 421 ms    P Axis 67 degrees    R Axis 70 degrees    T Axis 73 degrees    Diagnosis Sinus tachycardia         CT CHEST PULMONARY EMBOLISM W CONTRAST   Final Result      1. Negative for pulmonary embolism. 2.  Small cluster of centrilobular nodularities in the right middle lobe,   nonspecific but may be infectious or inflammatory. 3.  Extensive bullous changes in the left lower lobe. Small consolidation in   the left lung base and small cluster of centrilobular nodularities in the right   upper lobe. These findings are similar dating back to November 20, 2015 CT. 4.  No new pulmonary consolidation. No pneumothorax or pulmonary edema. XR CHEST PORTABLE   Final Result   No acute findings in the chest                               Voice dictation software was used during the making of this note.   This software is not perfect and grammatical and other typographical errors may be present. This note has not been completely proofread for errors.      Mony Carrington PA-C  11/11/22 [id number]

## 2022-11-11 NOTE — ED NOTES
Pt found to be hypoxic while resting in bed, oxygen saturation 88-89% on room air while at rest. Pt placed on 2L oxygen via NC. Oxygen saturation rylie to 93-94%. Provider notified.       Ilan Figueredo RN  11/11/22 5723

## 2022-11-11 NOTE — ACP (ADVANCE CARE PLANNING)
[facility] Hospitalist Service  At the heart of better care     Advance Care Planning   Admit Date:  2022  2:42 AM   Name:  Marissa Sorto   Age:  61 y.o. Sex:  female  :  1965   MRN:  [id number]   Room:  [place] [address]    Marissa Sorto is able to make her own decisions:   Yes     If pt unable to make decisions, POA/surrogate decision maker:  Daughter, Vianca Jeffrey     Other people present: This physician     Patient / surrogate decision-maker directed code status:  DNR     Other ACP topics discussed, if applicable:   Treatment of COPD   Advise on smoking cessation. Patient or surrogate consented to discussion of the current conditions, workup, management plans, prognosis, and the risk for further deterioration. Time spent: 18 minutes in direct discussion.       Signed:  Deon Chew MD

## 2022-11-12 PROBLEM — R93.89 ABNORMAL CT OF THE CHEST: Status: ACTIVE | Noted: 2022-11-12

## 2022-11-12 PROBLEM — J96.01 ACUTE HYPOXEMIC RESPIRATORY FAILURE (HCC): Status: ACTIVE | Noted: 2022-11-12

## 2022-11-12 PROCEDURE — 2580000003 HC RX 258: Performed by: INTERNAL MEDICINE

## 2022-11-12 PROCEDURE — 2700000000 HC OXYGEN THERAPY PER DAY

## 2022-11-12 PROCEDURE — 6370000000 HC RX 637 (ALT 250 FOR IP): Performed by: INTERNAL MEDICINE

## 2022-11-12 PROCEDURE — 94761 N-INVAS EAR/PLS OXIMETRY MLT: CPT

## 2022-11-12 PROCEDURE — 6360000002 HC RX W HCPCS: Performed by: INTERNAL MEDICINE

## 2022-11-12 PROCEDURE — 2580000003 HC RX 258: Performed by: EMERGENCY MEDICINE

## 2022-11-12 PROCEDURE — 94760 N-INVAS EAR/PLS OXIMETRY 1: CPT

## 2022-11-12 PROCEDURE — 94640 AIRWAY INHALATION TREATMENT: CPT

## 2022-11-12 PROCEDURE — 1100000000 HC RM PRIVATE

## 2022-11-12 RX ORDER — DICYCLOMINE HYDROCHLORIDE 10 MG/1
10 CAPSULE ORAL
Status: DISCONTINUED | OUTPATIENT
Start: 2022-11-12 | End: 2022-11-13 | Stop reason: HOSPADM

## 2022-11-12 RX ORDER — BUDESONIDE 0.5 MG/2ML
0.5 INHALANT ORAL 2 TIMES DAILY
Status: DISCONTINUED | OUTPATIENT
Start: 2022-11-12 | End: 2022-11-13 | Stop reason: HOSPADM

## 2022-11-12 RX ADMIN — SODIUM CHLORIDE, PRESERVATIVE FREE 10 ML: 5 INJECTION INTRAVENOUS at 12:45

## 2022-11-12 RX ADMIN — AZITHROMYCIN 500 MG: 500 INJECTION, POWDER, LYOPHILIZED, FOR SOLUTION INTRAVENOUS at 12:45

## 2022-11-12 RX ADMIN — SODIUM CHLORIDE, PRESERVATIVE FREE 10 ML: 5 INJECTION INTRAVENOUS at 08:38

## 2022-11-12 RX ADMIN — SODIUM CHLORIDE, PRESERVATIVE FREE 10 ML: 5 INJECTION INTRAVENOUS at 20:32

## 2022-11-12 RX ADMIN — POLYETHYLENE GLYCOL 3350 17 G: 17 POWDER, FOR SOLUTION ORAL at 09:04

## 2022-11-12 RX ADMIN — IPRATROPIUM BROMIDE AND ALBUTEROL SULFATE 1 AMPULE: .5; 3 SOLUTION RESPIRATORY (INHALATION) at 15:34

## 2022-11-12 RX ADMIN — DICYCLOMINE HYDROCHLORIDE 10 MG: 10 CAPSULE ORAL at 17:28

## 2022-11-12 RX ADMIN — IPRATROPIUM BROMIDE AND ALBUTEROL SULFATE 1 AMPULE: .5; 3 SOLUTION RESPIRATORY (INHALATION) at 12:17

## 2022-11-12 RX ADMIN — BUDESONIDE 500 MCG: 0.5 INHALANT RESPIRATORY (INHALATION) at 12:17

## 2022-11-12 RX ADMIN — BUDESONIDE 500 MCG: 0.5 INHALANT RESPIRATORY (INHALATION) at 21:20

## 2022-11-12 RX ADMIN — GUAIFENESIN 600 MG: 600 TABLET, EXTENDED RELEASE ORAL at 20:32

## 2022-11-12 RX ADMIN — PREDNISONE 20 MG: 20 TABLET ORAL at 08:30

## 2022-11-12 RX ADMIN — ONDANSETRON 4 MG: 4 TABLET, ORALLY DISINTEGRATING ORAL at 13:50

## 2022-11-12 RX ADMIN — IPRATROPIUM BROMIDE AND ALBUTEROL SULFATE 1 AMPULE: .5; 3 SOLUTION RESPIRATORY (INHALATION) at 21:20

## 2022-11-12 RX ADMIN — SODIUM CHLORIDE, PRESERVATIVE FREE 10 ML: 5 INJECTION INTRAVENOUS at 20:33

## 2022-11-12 RX ADMIN — IPRATROPIUM BROMIDE AND ALBUTEROL SULFATE 1 AMPULE: .5; 3 SOLUTION RESPIRATORY (INHALATION) at 08:37

## 2022-11-12 RX ADMIN — GUAIFENESIN 600 MG: 600 TABLET, EXTENDED RELEASE ORAL at 08:30

## 2022-11-12 NOTE — PROGRESS NOTES
Reviewed notes for new spiritual concerns. Worship        Daughter - jessica    Lives in [facility]    Works for [employer]    dnr    No directives    Exp d/c 1 day    [place] 1 day                  Will follow as needed.

## 2022-11-12 NOTE — PROGRESS NOTES
Pt admitted overnight w/ COPD exacerbation  Uneventful shift  Continuous pulse ox per orders    Shift report given to Rayne Schaumann, oncoming RN    Vitals:    11/11/22 2053 11/11/22 2300 11/11/22 2312 11/12/22 0332   BP:  119/78 119/78 119/77   Pulse: 95 93 92 89   Resp: 18 16  18   Temp:  97.2 °F (36.2 °C) 97.2 °F (36.2 °C) 97.1 °F (36.2 °C)   TempSrc:  Oral  Oral   SpO2: 93% 95% 93% 95%   Weight:       Height:

## 2022-11-12 NOTE — PROGRESS NOTES
Hospitalist Progress Note   Admit Date:  2022  2:42 AM   Name:  Peter Youngblood   Age:  61 y.o. Sex:  female  :  1965   MRN:  [id number]   Room:  [facility]/    Presenting Complaint: Shortness of Breath     Reason(s) for Admission: Tachycardia [R00.0]  Hypoxia [R09.02]  Atypical pneumonia [J18.9]  COPD exacerbation [facility]) [J44.1]     Hospital Course:   Peter Youngblood is a 61 y.o. female with medical history of   COPD, not on oxygen at home. Smoking for the past 20-30 years. Still smoking. BMI of 34     She does not take home medications. She denies hypertension, hyperlipidemia. who presented with shortness of breath for the past few days. Cough, no fever, shortness of breath. Patient was found to have acute respiratory failure with oxygen saturation of 87% on room air in ER. She was given 2 L/min of oxygen via cannula and her oxygen saturation was up to 93%. Patient continues to feel shortness of breath and coughing. CXR does not show acute findings. Hospitalist service is requested to admit the patient. Subjective & 24hr Events (22): And she feels better. She tells me she quit smoking 1 year ago but history and physical suggest patient reportedly still smoking. Treating as exacerbation of COPD. Still requiring oxygen regarding acute hypoxemic respiratory failure--not clear that she has been evaluated outpatient does not follow with pulmonology. Bullous disease noted. May need ambulatory O2 eval.  Denies chest pain. Denies palpitations fever shaking chills or diarrhea. Review of Systems:  10 systems reviewed and negative except as noted above. Assessment & Plan:      Principal Problem:    COPD exacerbation ([facility] [address]  Continue bronchodilators, pulmonary toilet. Evaluate ambulatory oxygen sat. Wean off oxygen as able-could need home oxygen.-No significant bronchospasm will keep prednisone oral therapy for now.        Acute hypoxemic respiratory failure based on no home oxygen and now sat less than 88% documented on admission-wean off oxygen as able-if not evaluate for need for home oxygen. Active Problems:    Smoker  Plan:   11/12--patient informs me that she quit 1 year ago. BMI of 34        Patient requires hospital stay as an in-patient and anticipated stay is more than 2 midnights due to the serious nature of the illness. Patient has no pain now. Will monitor. Further treatments will depend on initial responses and findings. I have discussed the plan of care with patient. Anticipated discharge needs:     Home in 1-2 days. Diet: ADULT DIET; Regular  VTE ppx: Enoxaparin SC   Code status: Full Code          Hospital Problems:  Principal Problem:    COPD exacerbation ([address]  Active Problems:    Smoker    Acute hypoxemic respiratory failure (HCC)    Abnormal CT of the chest    Giant bullous emphysema (HCC)  Resolved Problems:    * No resolved hospital problems. *      Objective:   Patient Vitals for the past 24 hrs:   Temp Pulse Resp BP SpO2   11/12/22 1534 -- 92 16 -- 91 %   11/12/22 1515 97.9 °F (36.6 °C) 95 18 117/77 92 %   11/12/22 1217 -- 86 18 -- 92 %   11/12/22 1120 98.3 °F (36.8 °C) 92 18 109/68 92 %   11/12/22 0837 -- 83 17 -- 93 %   11/12/22 0730 98.2 °F (36.8 °C) 89 18 115/74 95 %   11/12/22 0332 97.1 °F (36.2 °C) 89 18 119/77 95 %   11/11/22 2312 97.2 °F (36.2 °C) 92 -- 119/78 93 %   11/11/22 2300 97.2 °F (36.2 °C) 93 16 119/78 95 %   11/11/22 2053 -- 95 18 -- 93 %   11/11/22 1920 97.9 °F (36.6 °C) (!) 108 22 125/74 92 %   11/11/22 1828 -- (!) 106 21 130/70 91 %   11/11/22 1758 -- (!) 112 18 124/76 92 %       Oxygen Therapy  SpO2: 91 %  Pulse Oximeter Device Mode: Intermittent  Pulse Oximeter Device Location: Right, Finger  O2 Device: None (Room air)  O2 Flow Rate (L/min): 2 L/min    Estimated body mass index is 33.84 kg/m² as calculated from the following:    Height as of this encounter: 5' 2\" (1.575 m).     Weight as of this encounter: 185 lb (83.9 kg). Intake/Output Summary (Last 24 hours) at 11/12/2022 1641  Last data filed at 11/12/2022 1301  Gross per 24 hour   Intake 720 ml   Output --   Net 720 ml         Physical Exam:     Blood pressure 117/77, pulse 92, temperature 97.9 °F (36.6 °C), temperature source Oral, resp. rate 16, height 5' 2\" (1.575 m), weight 185 lb (83.9 kg), SpO2 91 %. General:    Well nourished. No acute distress-alert and oriented x3  Head:  Normocephalic, atraumatic  Eyes:  Sclerae appear normal.  Pupils equally round. ENT:  Nares appear normal, no drainage. Moist oral mucosa  Neck:  No restricted ROM. Trachea midline   CV:   RRR. No m/r/g. No jugular venous distension. Lungs:   No significant bronchospasm but significant decreased breath sounds bilateral.  No gross consolidation  Abdomen: Bowel sounds present. Soft, nontender, nondistended. Extremities: No cyanosis or clubbing. No increased peripheral edema  Skin:     No rashes and normal coloration. Warm and dry. Neuro:  CN II-XII grossly intact. Sensation intact. A&Ox3  Psych:  Normal mood and affect.       I have personally reviewed labs and tests showing:  Recent Labs:  Recent Results (from the past 48 hour(s))   EKG 12 Lead    Collection Time: 11/10/22  8:54 PM   Result Value Ref Range    Ventricular Rate 122 BPM    Atrial Rate 122 BPM    P-R Interval 152 ms    QRS Duration 80 ms    Q-T Interval 296 ms    QTc Calculation (Bazett) 421 ms    P Axis 67 degrees    R Axis 70 degrees    T Axis 73 degrees    Diagnosis Sinus tachycardia    CBC with Auto Differential    Collection Time: 11/10/22  8:55 PM   Result Value Ref Range    WBC 12.0 (H) 4.3 - 11.1 K/uL    RBC 4.82 4.05 - 5.2 M/uL    Hemoglobin 14.1 11.7 - 15.4 g/dL    Hematocrit 44.1 35.8 - 46.3 %    MCV 91.5 82 - 102 FL    MCH 29.3 26.1 - 32.9 PG    MCHC 32.0 31.4 - 35.0 g/dL    RDW 13.4 11.9 - 14.6 %    Platelets 336 631 - 094 K/uL    MPV 10.8 9.4 - 12.3 FL    nRBC 0.00 0.0 - 0.2 K/uL    Differential Type AUTOMATED      Seg Neutrophils 76 43 - 78 %    Lymphocytes 16 13 - 44 %    Monocytes 6 4.0 - 12.0 %    Eosinophils % 1 0.5 - 7.8 %    Basophils 1 0.0 - 2.0 %    Immature Granulocytes 0 0.0 - 5.0 %    Segs Absolute 9.1 (H) 1.7 - 8.2 K/UL    Absolute Lymph # 2.0 0.5 - 4.6 K/UL    Absolute Mono # 0.7 0.1 - 1.3 K/UL    Absolute Eos # 0.1 0.0 - 0.8 K/UL    Basophils Absolute 0.1 0.0 - 0.2 K/UL    Absolute Immature Granulocyte 0.0 0.0 - 0.5 K/UL   Comprehensive Metabolic Panel    Collection Time: 11/10/22  8:55 PM   Result Value Ref Range    Sodium 135 133 - 143 mmol/L    Potassium 3.8 3.5 - 5.1 mmol/L    Chloride 104 101 - 110 mmol/L    CO2 27 21 - 32 mmol/L    Anion Gap 4 2 - 11 mmol/L    Glucose 155 (H) 65 - 100 mg/dL    BUN 14 6 - 23 MG/DL    Creatinine 0.80 0.6 - 1.0 MG/DL    Est, Glom Filt Rate >60 >60 ml/min/1.73m2    Calcium 9.3 8.3 - 10.4 MG/DL    Total Bilirubin 0.4 0.2 - 1.1 MG/DL    ALT 38 12 - 65 U/L    AST 19 15 - 37 U/L    Alk Phosphatase 67 50 - 136 U/L    Total Protein 7.5 6.3 - 8.2 g/dL    Albumin 3.7 3.5 - 5.0 g/dL    Globulin 3.8 2.8 - 4.5 g/dL    Albumin/Globulin Ratio 1.0 0.4 - 1.6     Magnesium    Collection Time: 11/10/22  8:55 PM   Result Value Ref Range    Magnesium 2.2 1.8 - 2.4 mg/dL   Respiratory Panel, Molecular, with COVID-19 (Restricted: peds pts or suitable admitted adults)    Collection Time: 11/10/22  8:55 PM    Specimen: Nasopharyngeal   Result Value Ref Range    Adenovirus by PCR NOT DETECTED NOTDET      Coronavirus 229E by PCR NOT DETECTED NOTDET      Coronavirus HKU1 by PCR NOT DETECTED NOTDET      Coronavirus NL63 by PCR NOT DETECTED NOTDET      Coronavirus OC43 by PCR NOT DETECTED NOTDET      SARS-CoV-2, PCR NOT DETECTED NOTDET      Human Metapneumovirus by PCR NOT DETECTED NOTDET      Rhinovirus Enterovirus PCR NOT DETECTED NOTDET      Influenza A by PCR NOT DETECTED NOTDET      Influenza B PCR NOT DETECTED NOTDET      Parainfluenza 1 PCR NOT DETECTED NOTDET      Parainfluenza 2 PCR NOT DETECTED NOTDET      Parainfluenza 3 PCR NOT DETECTED NOTDET      Parainfluenza 4 PCR NOT DETECTED NOTDET      Respiratory Syncytial Virus by PCR NOT DETECTED NOTDET      Bordetella parapertussis by PCR NOT DETECTED NOTDET      Bordetella pertussis by PCR NOT DETECTED NOTDET      Chlamydophila Pneumonia PCR NOT DETECTED NOTDET      Mycoplasma pneumo by PCR NOT DETECTED NOTDET     D-Dimer, Quantitative    Collection Time: 11/11/22  3:56 AM   Result Value Ref Range    D-Dimer, Quant 0.76 (H) <0.56 ug/ml(FEU)       I have personally reviewed imaging studies showing: Other Studies:  CT CHEST PULMONARY EMBOLISM W CONTRAST   Final Result      1. Negative for pulmonary embolism. 2.  Small cluster of centrilobular nodularities in the right middle lobe,   nonspecific but may be infectious or inflammatory. 3.  Extensive bullous changes in the left lower lobe. Small consolidation in   the left lung base and small cluster of centrilobular nodularities in the right   upper lobe. These findings are similar dating back to November 20, 2015 CT. 4.  No new pulmonary consolidation. No pneumothorax or pulmonary edema.       XR CHEST PORTABLE   Final Result   No acute findings in the chest             Current Meds:  Current Facility-Administered Medications   Medication Dose Route Frequency    budesonide (PULMICORT) nebulizer suspension 500 mcg  0.5 mg Nebulization BID    azithromycin (ZITHROMAX) 500 mg in sodium chloride 0.9 % 250 mL IVPB ([id number])  500 mg IntraVENous Q24H    dicyclomine (BENTYL) capsule 10 mg  10 mg Oral TID AC    0.9 % sodium chloride bolus  100 mL IntraVENous ONCE PRN    sodium chloride flush 0.9 % injection 10 mL  10 mL IntraVENous ONCE PRN    sodium chloride flush 0.9 % injection 5-40 mL  5-40 mL IntraVENous 2 times per day    sodium chloride flush 0.9 % injection 5-40 mL  5-40 mL IntraVENous PRN    0.9 % sodium chloride infusion   IntraVENous PRN enoxaparin (LOVENOX) injection 40 mg  40 mg SubCUTAneous Daily    ondansetron (ZOFRAN-ODT) disintegrating tablet 4 mg  4 mg Oral Q8H PRN    Or    ondansetron (ZOFRAN) injection 4 mg  4 mg IntraVENous Q6H PRN    polyethylene glycol (GLYCOLAX) packet 17 g  17 g Oral Daily PRN    acetaminophen (TYLENOL) tablet 650 mg  650 mg Oral Q6H PRN    Or    acetaminophen (TYLENOL) suppository 650 mg  650 mg Rectal Q6H PRN    ipratropium-albuterol (DUONEB) nebulizer solution 1 ampule  1 ampule Inhalation Q4H WA    guaiFENesin (MUCINEX) extended release tablet 600 mg  600 mg Oral BID    predniSONE (DELTASONE) tablet 20 mg  20 mg Oral Daily    sodium chloride flush 0.9 % injection 10 mL  10 mL IntraVENous Q8H    sodium chloride flush 0.9 % injection 10 mL  10 mL IntraVENous PRN       Signed:  Shon Gray DO    Part of this note may have been written by using a voice dictation software. The note has been proof read but may still contain some grammatical/other typographical errors.

## 2022-11-12 NOTE — PROGRESS NOTES
11/11/22 2055   Oxygen Therapy/Pulse Ox   Pulse Oximeter Device Mode Continuous  (CAP 6)   $Pulse Oximeter $Spot check (multiple/continuous)    Pt placed on CAP 6 for a continuous pulse ox. Pt is on 3 L/min. Will continue to monitor pt.

## 2022-11-13 VITALS
RESPIRATION RATE: 16 BRPM | WEIGHT: 211 LBS | OXYGEN SATURATION: 92 % | HEART RATE: 88 BPM | DIASTOLIC BLOOD PRESSURE: 57 MMHG | BODY MASS INDEX: 38.83 KG/M2 | HEIGHT: 62 IN | TEMPERATURE: 98.1 F | SYSTOLIC BLOOD PRESSURE: 101 MMHG

## 2022-11-13 LAB
ANION GAP SERPL CALC-SCNC: 3 MMOL/L (ref 2–11)
BASOPHILS # BLD: 0.1 K/UL (ref 0–0.2)
BASOPHILS NFR BLD: 1 % (ref 0–2)
BUN SERPL-MCNC: 13 MG/DL (ref 6–23)
CALCIUM SERPL-MCNC: 9.3 MG/DL (ref 8.3–10.4)
CHLORIDE SERPL-SCNC: 107 MMOL/L (ref 101–110)
CO2 SERPL-SCNC: 27 MMOL/L (ref 21–32)
CREAT SERPL-MCNC: 0.7 MG/DL (ref 0.6–1)
DIFFERENTIAL METHOD BLD: NORMAL
EOSINOPHIL # BLD: 0.2 K/UL (ref 0–0.8)
EOSINOPHIL NFR BLD: 2 % (ref 0.5–7.8)
ERYTHROCYTE [DISTWIDTH] IN BLOOD BY AUTOMATED COUNT: 13.5 % (ref 11.9–14.6)
GLUCOSE SERPL-MCNC: 96 MG/DL (ref 65–100)
HCT VFR BLD AUTO: 42.7 % (ref 35.8–46.3)
HGB BLD-MCNC: 13.6 G/DL (ref 11.7–15.4)
IMM GRANULOCYTES # BLD AUTO: 0 K/UL (ref 0–0.5)
IMM GRANULOCYTES NFR BLD AUTO: 0 % (ref 0–5)
LYMPHOCYTES # BLD: 3.4 K/UL (ref 0.5–4.6)
LYMPHOCYTES NFR BLD: 38 % (ref 13–44)
MAGNESIUM SERPL-MCNC: 2.2 MG/DL (ref 1.8–2.4)
MCH RBC QN AUTO: 29.4 PG (ref 26.1–32.9)
MCHC RBC AUTO-ENTMCNC: 31.9 G/DL (ref 31.4–35)
MCV RBC AUTO: 92.2 FL (ref 82–102)
MONOCYTES # BLD: 0.8 K/UL (ref 0.1–1.3)
MONOCYTES NFR BLD: 9 % (ref 4–12)
NEUTS SEG # BLD: 4.4 K/UL (ref 1.7–8.2)
NEUTS SEG NFR BLD: 50 % (ref 43–78)
NRBC # BLD: 0 K/UL (ref 0–0.2)
PLATELET # BLD AUTO: 250 K/UL (ref 150–450)
PMV BLD AUTO: 10.5 FL (ref 9.4–12.3)
POTASSIUM SERPL-SCNC: 4.1 MMOL/L (ref 3.5–5.1)
RBC # BLD AUTO: 4.63 M/UL (ref 4.05–5.2)
SODIUM SERPL-SCNC: 137 MMOL/L (ref 133–143)
WBC # BLD AUTO: 8.8 K/UL (ref 4.3–11.1)

## 2022-11-13 PROCEDURE — 85025 COMPLETE CBC W/AUTO DIFF WBC: CPT

## 2022-11-13 PROCEDURE — 6370000000 HC RX 637 (ALT 250 FOR IP): Performed by: INTERNAL MEDICINE

## 2022-11-13 PROCEDURE — 94640 AIRWAY INHALATION TREATMENT: CPT

## 2022-11-13 PROCEDURE — 94760 N-INVAS EAR/PLS OXIMETRY 1: CPT

## 2022-11-13 PROCEDURE — 2580000003 HC RX 258: Performed by: EMERGENCY MEDICINE

## 2022-11-13 PROCEDURE — 36415 COLL VENOUS BLD VENIPUNCTURE: CPT

## 2022-11-13 PROCEDURE — 80048 BASIC METABOLIC PNL TOTAL CA: CPT

## 2022-11-13 PROCEDURE — 2580000003 HC RX 258: Performed by: INTERNAL MEDICINE

## 2022-11-13 PROCEDURE — 6360000002 HC RX W HCPCS: Performed by: INTERNAL MEDICINE

## 2022-11-13 PROCEDURE — 83735 ASSAY OF MAGNESIUM: CPT

## 2022-11-13 RX ORDER — ALBUTEROL SULFATE 90 UG/1
2 AEROSOL, METERED RESPIRATORY (INHALATION) EVERY 4 HOURS PRN
Qty: 18 G | Refills: 0 | Status: SHIPPED | OUTPATIENT
Start: 2022-11-13 | End: 2022-11-13 | Stop reason: SDUPTHER

## 2022-11-13 RX ORDER — PREDNISONE 10 MG/1
TABLET ORAL
Qty: 9 TABLET | Refills: 0 | Status: SHIPPED | OUTPATIENT
Start: 2022-11-13 | End: 2022-11-13 | Stop reason: SDUPTHER

## 2022-11-13 RX ORDER — ALBUTEROL SULFATE 90 UG/1
2 AEROSOL, METERED RESPIRATORY (INHALATION) EVERY 4 HOURS PRN
Qty: 18 G | Refills: 1 | Status: SHIPPED | OUTPATIENT
Start: 2022-11-13

## 2022-11-13 RX ORDER — AZITHROMYCIN 500 MG/1
500 TABLET, FILM COATED ORAL DAILY
Qty: 3 TABLET | Refills: 0 | Status: SHIPPED | OUTPATIENT
Start: 2022-11-13 | End: 2022-11-16

## 2022-11-13 RX ORDER — PREDNISONE 10 MG/1
TABLET ORAL
Qty: 10 TABLET | Refills: 0 | Status: SHIPPED | OUTPATIENT
Start: 2022-11-13

## 2022-11-13 RX ADMIN — SODIUM CHLORIDE, PRESERVATIVE FREE 10 ML: 5 INJECTION INTRAVENOUS at 09:37

## 2022-11-13 RX ADMIN — GUAIFENESIN 600 MG: 600 TABLET, EXTENDED RELEASE ORAL at 08:36

## 2022-11-13 RX ADMIN — BUDESONIDE 500 MCG: 0.5 INHALANT RESPIRATORY (INHALATION) at 08:07

## 2022-11-13 RX ADMIN — SODIUM CHLORIDE, PRESERVATIVE FREE 10 ML: 5 INJECTION INTRAVENOUS at 05:52

## 2022-11-13 RX ADMIN — DICYCLOMINE HYDROCHLORIDE 10 MG: 10 CAPSULE ORAL at 05:51

## 2022-11-13 RX ADMIN — IPRATROPIUM BROMIDE AND ALBUTEROL SULFATE 1 AMPULE: .5; 3 SOLUTION RESPIRATORY (INHALATION) at 08:07

## 2022-11-13 RX ADMIN — PREDNISONE 20 MG: 20 TABLET ORAL at 08:36

## 2022-11-13 NOTE — DISCHARGE SUMMARY
Hospitalist Discharge Summary   Admit Date:  2022  2:42 AM   DC Note date: 2022  Name:  Nelli Swenson   Age:  61 y.o. Sex:  female  :  1965   MRN:  [id number]   Room:  [facility]  PCP:  Jorge Alatorre MD    Presenting Complaint: Shortness of Breath     Initial Admission Diagnosis: Tachycardia [R00.0]  Hypoxia [R09.02]  Atypical pneumonia [J18.9]  COPD exacerbation ([address] [J44.1]     Problem List for this Hospitalization (present on admission):    Principal Problem:    COPD exacerbation ([address]  Active Problems:    Smoker    Acute hypoxemic respiratory failure ([address]    Abnormal CT of the chest    Giant bullous emphysema ([address]  Resolved Problems:    * No resolved hospital problems. *      Hospital Course:  79-year-old female history of COPD no prior home oxygen smoking 20 to 30 years she reports she recently quit-admission history and physical claim still smoking but she informed me: \"Not quite yet a year\". Admitted with progressive dyspnea no fever chest x-ray without obvious pneumonia. CT chest abnormal with chronic findings of bullous left lower lobe disease and chronic right upper lobe nodularity no change since  and new finding on CT chest of right middle lobe centrilobular nodularities nonspecific but infectious or inflammatory possible. She did require oxygen at time of admission due to O2 sat of 87% room air. Fortunately with pulmonary toilet systemic steroids empiric antibiotics directed towards tracheobronchitis we have been able to wean her off oxygen. I strongly encouraged her to follow-up with primary care within the next 1 week and she will need to call for appointment and recommend referral to pulmonology given severity of her lung disease. She appears to understand. Also recommend consider repeat CT chest versus other imaging regarding right middle lobe nonspecific centrilobular nodularities. She is motivated for continued smoking cessation.   She is stable for discharge home for further details regarding her hospital stay please refer to the entirety of the medical record. Disposition: Stable for discharge home  Diet: ADULT DIET; Regular  Code Status: DNR    Follow Ups:      Time spent in patient discharge and coordination 39 minutes. Follow up labs/diagnostics (ultimately defer to outpatient provider): As per primary care outpatient provider    Plan was discussed with patient and staff. All questions answered. Patient was stable at time of discharge. Instructions given to call a physician or return if any concerns. Current Discharge Medication List        START taking these medications    Details   predniSONE (DELTASONE) 10 MG tablet Take 2 daily for 3 days then 1 daily for 3 days. Qty: 9 tablet, Refills: 0      azithromycin (ZITHROMAX) 500 MG tablet Take 1 tablet by mouth daily for 3 days  Qty: 3 tablet, Refills: 0    Associated Diagnoses: COPD exacerbation ([facility] [address]           CONTINUE these medications which have CHANGED    Details   albuterol sulfate HFA (PROVENTIL;VENTOLIN;PROAIR) 108 (90 Base) MCG/ACT inhaler Inhale 2 puffs into the lungs every 4 hours as needed for Wheezing or Shortness of Breath  Qty: 18 g, Refills: 0           STOP taking these medications       HYDROcodone-acetaminophen 7.5-325 MG per 15ML solution Comments:   Reason for Stopping:         ibuprofen (ADVIL;MOTRIN) 200 MG CAPS Comments:   Reason for Stopping:         phentermine (ADIPEX-P) 37.5 MG tablet Comments:   Reason for Stopping:         phentermine (ADIPEX-P) 37.5 MG tablet Comments:   Reason for Stopping:               Procedures done this admission:  * No surgery found *    Consults this admission:  None    Echocardiogram results:  No results found for this or any previous visit. Diagnostic Imaging/Tests:   XR CHEST PORTABLE    Result Date: 11/10/2022  No acute findings in the chest     CT CHEST PULMONARY EMBOLISM W CONTRAST    Result Date: 11/11/2022  1.   Negative for pulmonary embolism. 2.  Small cluster of centrilobular nodularities in the right middle lobe, nonspecific but may be infectious or inflammatory. 3.  Extensive bullous changes in the left lower lobe. Small consolidation in the left lung base and small cluster of centrilobular nodularities in the right upper lobe. These findings are similar dating back to November 20, 2015 CT. 4.  No new pulmonary consolidation. No pneumothorax or pulmonary edema. Labs: Results:       BMP, Mg, Phos Recent Labs     11/10/22  2055 11/13/22  0657    137   K 3.8 4.1    107   CO2 27 27   ANIONGAP 4 3   BUN 14 13   CREATININE 0.80 0.70   LABGLOM >60 >60   CALCIUM 9.3 9.3   GLUCOSE 155* 96   MG 2.2 2.2      CBC Recent Labs     11/10/22  2055 11/13/22  0657   WBC 12.0* 8.8   RBC 4.82 4.63   HGB 14.1 13.6   HCT 44.1 42.7   MCV 91.5 92.2   MCH 29.3 29.4   MCHC 32.0 31.9   RDW 13.4 13.5    250   MPV 10.8 10.5   NRBC 0.00 0.00   SEGS 76 50   LYMPHOPCT 16 38   EOSRELPCT 1 2   MONOPCT 6 9   BASOPCT 1 1   IMMGRAN 0 0   SEGSABS 9.1* 4.4   LYMPHSABS 2.0 3.4   EOSABS 0.1 0.2   MONOSABS 0.7 0.8   BASOSABS 0.1 0.1   ABSIMMGRAN 0.0 0.0      LFT Recent Labs     11/10/22  2055   BILITOT 0.4   ALKPHOS 67   AST 19   ALT 38   PROT 7.5   LABALBU 3.7   GLOB 3.8      Cardiac  No results found for: NTPROBNP, TROPHS   Coags No results found for: PROTIME, INR, APTT   A1c No results found for: LABA1C, EAG   Lipids No results found for: CHOL, LDLCALC, LABVLDL, HDL, CHOLHDLRATIO, TRIG   Thyroid  No results found for: TSHELE, [id number]     Most Recent UA No results found for: COLORU, APPEARANCE, SPECGRAV, LABPH, PROTEINU, GLUCOSEU, KETUA, BILIRUBINUR, BLOODU, UROBILINOGEN, NITRU, LEUKOCYTESUR, WBCUA, RBCUA, EPITHUA, BACTERIA, LABCAST, MUCUS     No results for input(s): CULTURE in the last 720 hours.     All Labs from Last 24 Hrs:  Recent Results (from the past 24 hour(s))   CBC with Auto Differential    Collection Time: 11/13/22  6:57 AM Result Value Ref Range    WBC 8.8 4.3 - 11.1 K/uL    RBC 4.63 4.05 - 5.2 M/uL    Hemoglobin 13.6 11.7 - 15.4 g/dL    Hematocrit 42.7 35.8 - 46.3 %    MCV 92.2 82 - 102 FL    MCH 29.4 26.1 - 32.9 PG    MCHC 31.9 31.4 - 35.0 g/dL    RDW 13.5 11.9 - 14.6 %    Platelets 447 427 - 320 K/uL    MPV 10.5 9.4 - 12.3 FL    nRBC 0.00 0.0 - 0.2 K/uL    Differential Type AUTOMATED      Seg Neutrophils 50 43 - 78 %    Lymphocytes 38 13 - 44 %    Monocytes 9 4.0 - 12.0 %    Eosinophils % 2 0.5 - 7.8 %    Basophils 1 0.0 - 2.0 %    Immature Granulocytes 0 0.0 - 5.0 %    Segs Absolute 4.4 1.7 - 8.2 K/UL    Absolute Lymph # 3.4 0.5 - 4.6 K/UL    Absolute Mono # 0.8 0.1 - 1.3 K/UL    Absolute Eos # 0.2 0.0 - 0.8 K/UL    Basophils Absolute 0.1 0.0 - 0.2 K/UL    Absolute Immature Granulocyte 0.0 0.0 - 0.5 K/UL   Basic Metabolic Panel    Collection Time: 11/13/22  6:57 AM   Result Value Ref Range    Sodium 137 133 - 143 mmol/L    Potassium 4.1 3.5 - 5.1 mmol/L    Chloride 107 101 - 110 mmol/L    CO2 27 21 - 32 mmol/L    Anion Gap 3 2 - 11 mmol/L    Glucose 96 65 - 100 mg/dL    BUN 13 6 - 23 MG/DL    Creatinine 0.70 0.6 - 1.0 MG/DL    Est, Glom Filt Rate >60 >60 ml/min/1.73m2    Calcium 9.3 8.3 - 10.4 MG/DL   Magnesium    Collection Time: 11/13/22  6:57 AM   Result Value Ref Range    Magnesium 2.2 1.8 - 2.4 mg/dL       Allergies   Allergen Reactions    Sulfa Antibiotics Hives     Immunization History   Administered Date(s) Administered    PPD Test 11/20/2015       Recent Vital Data:  Patient Vitals for the past 24 hrs:   Temp Pulse Resp BP SpO2   11/13/22 0807 -- 88 16 -- 92 %   11/13/22 0735 98.1 °F (36.7 °C) 82 18 (!) 101/57 92 %   11/13/22 0328 98.7 °F (37.1 °C) 83 18 107/75 94 %   11/12/22 2313 97.8 °F (36.6 °C) 86 17 100/64 93 %   11/12/22 2120 -- 86 16 -- 93 %   11/12/22 1924 98 °F (36.7 °C) 93 17 109/65 92 %   11/12/22 1534 -- 92 16 -- 91 %   11/12/22 1515 97.9 °F (36.6 °C) 95 18 117/77 92 %   11/12/22 1217 -- 86 18 -- 92 % 11/12/22 1120 98.3 °F (36.8 °C) 92 18 109/68 92 %       Oxygen Therapy  SpO2: 92 %  Pulse Oximeter Device Mode: Intermittent  Pulse Oximeter Device Location: Right, Finger  O2 Device: None (Room air)  O2 Flow Rate (L/min): 2 L/min    Estimated body mass index is 38.59 kg/m² as calculated from the following:    Height as of this encounter: 5' 2\" (1.575 m). Weight as of this encounter: 211 lb (95.7 kg). Intake/Output Summary (Last 24 hours) at 11/13/2022 1015  Last data filed at 11/13/2022 0844  Gross per 24 hour   Intake 1044 ml   Output --   Net 1044 ml         Physical Exam:    General:    Well nourished. No overt distress  Head:  Normocephalic, atraumatic  Eyes:  Sclerae appear normal.  Pupils equally round. HENT:  Nares appear normal, no drainage. Moist mucous membranes  Neck:  No restricted ROM. Trachea midline  CV:   RRR. No m/r/g. No JVD  Lungs:   Wheeze improved-significant decreased breath sounds bilateral, significant prolonged expiratory phase. Abdomen:   Soft, nontender, nondistended. Extremities: Warm and dry. No cyanosis or clubbing. No edema. Skin:     No rashes. Normal coloration  Neuro:  CN II-XII grossly intact. Psych:  Normal mood and affect. Signed:  Itzel De La Cruz DO    Part of this note may have been written by using a voice dictation software. The note has been proof read but may still contain some grammatical/other typographical errors.

## 2022-11-13 NOTE — PROGRESS NOTES
EOS:   Uneventful shift. Pt with coughing spells  No c/o pain, N/V/D. VSS. No needs voiced at this time.      Bsr given to Vaishnavi Pena

## 2022-11-13 NOTE — PLAN OF CARE
Problem: Safety - Adult  Goal: Free from fall injury  Outcome: Progressing  Flowsheets (Taken 11/12/2022 1905)  Free From Fall Injury: Instruct family/caregiver on patient safety